# Patient Record
Sex: MALE | Race: WHITE | Employment: FULL TIME | URBAN - METROPOLITAN AREA
[De-identification: names, ages, dates, MRNs, and addresses within clinical notes are randomized per-mention and may not be internally consistent; named-entity substitution may affect disease eponyms.]

---

## 2023-09-25 ENCOUNTER — OFFICE VISIT (OUTPATIENT)
Dept: ORTHOPEDIC SURGERY | Age: 33
End: 2023-09-25
Payer: COMMERCIAL

## 2023-09-25 ENCOUNTER — HOSPITAL ENCOUNTER (OUTPATIENT)
Dept: CT IMAGING | Facility: CLINIC | Age: 33
Discharge: HOME | End: 2023-09-27
Payer: COMMERCIAL

## 2023-09-25 VITALS — WEIGHT: 202 LBS | HEIGHT: 70 IN | BODY MASS INDEX: 28.92 KG/M2

## 2023-09-25 DIAGNOSIS — S82.391A CLOSED INTRA-ARTICULAR FRACTURE OF DISTAL END OF RIGHT TIBIA, INITIAL ENCOUNTER: Primary | ICD-10-CM

## 2023-09-25 DIAGNOSIS — S82.391A CLOSED INTRA-ARTICULAR FRACTURE OF DISTAL END OF RIGHT TIBIA, INITIAL ENCOUNTER: ICD-10-CM

## 2023-09-25 PROCEDURE — 73700 CT LOWER EXTREMITY W/O DYE: CPT

## 2023-09-25 PROCEDURE — 99203 OFFICE O/P NEW LOW 30 MIN: CPT | Performed by: ORTHOPAEDIC SURGERY

## 2023-09-25 NOTE — PROGRESS NOTES
This 34year-old patient is seen here for an injury he sustained to his right leg on 9/24/2023 when he was in [place] and was playing basketball. He was playing in the backyard and there was a little gravel path which was about 2 or 3 inches below the walkway. Because of the irregularity his toe got caught on the edge and he twisted his ankle and heard a pop. He had x-rays carried out locally and he brought the CD as well as should be the x-ray on his phone. Out of the splint examination shows significant swelling and marked tenderness over the lateral malleolus. He was able to show about 20 degrees of flexion extension of the ankle. X-rays: I reviewed the x-rays and on first look I thought it was a osteochondral fracture from the distal tibia but it appears it is from the talus lateral medial dome. Diagnosis: Osteochondral fracture talus. Treatment: Arranging for him to have a CT scan of this and we will see him again after that. He had an urgent CT scan carried out and it shows small osteochondral fracture from the talus which could be chronic in nature. I have asked the patient to return to the office tomorrow to discuss treatment option.

## 2023-09-26 ENCOUNTER — OFFICE VISIT (OUTPATIENT)
Dept: ORTHOPEDIC SURGERY | Age: 33
End: 2023-09-26
Payer: COMMERCIAL

## 2023-09-26 VITALS — BODY MASS INDEX: 28.92 KG/M2 | WEIGHT: 202 LBS | HEIGHT: 70 IN

## 2023-09-26 DIAGNOSIS — M72.2 PLANTAR FASCIITIS OF LEFT FOOT: ICD-10-CM

## 2023-09-26 DIAGNOSIS — S82.391A CLOSED INTRA-ARTICULAR FRACTURE OF DISTAL END OF RIGHT TIBIA, INITIAL ENCOUNTER: Primary | ICD-10-CM

## 2023-09-26 PROCEDURE — 99213 OFFICE O/P EST LOW 20 MIN: CPT | Performed by: ORTHOPAEDIC SURGERY

## 2023-09-26 RX ORDER — HYDROCODONE BITARTRATE AND ACETAMINOPHEN 5; 325 MG/1; MG/1
1 TABLET ORAL EVERY 6 HOURS PRN
Qty: 28 TABLET | Refills: 0 | Status: SHIPPED | OUTPATIENT
Start: 2023-09-26 | End: 2023-10-03

## 2023-09-26 NOTE — PROGRESS NOTES
This patient who had an injury to his right ankle and had CT scan carried out yesterday he is seen here to discuss options. The other complaint the patient had is of pain in both the hips from Planter fasciitis for which he has been treated with orthotics. These orthotics caused him about $400. He has had them since April and he does not feel that they have given him any benefit. He still continues to have significant plantar fascia pain particularly worse on the left side even after playing short game of basketball. As for the right ankle reviewed the x-ray and CT scan findings with him. The small osteochondral fracture from the talus is on the lateral side and will be difficult to approach this and therefore excision of this lesion advised. He is agreeable to this process and to unload the lateral side of the joint we will provide him with this very small medial heel wedge to be put on his orthotics. Patient is also willing to have corticosteroid injection in the left heel for Planter fasciitis. He had one before which she said lasted about 2 to 3 days only. The plan is that the he will have arthroscopy of the right ankle with removal of the osteochondral fragment and injection for the plantar fascia on the left side but I will check out the tender point on the left heel immediately preoperatively.

## 2023-10-04 ENCOUNTER — ANESTHESIA EVENT (OUTPATIENT)
Dept: OPERATING ROOM | Age: 33
End: 2023-10-04
Payer: COMMERCIAL

## 2023-10-04 ENCOUNTER — HOSPITAL ENCOUNTER (OUTPATIENT)
Age: 33
Setting detail: OUTPATIENT SURGERY
Discharge: HOME | End: 2023-10-04
Attending: ORTHOPAEDIC SURGERY | Admitting: ORTHOPAEDIC SURGERY
Payer: COMMERCIAL

## 2023-10-04 ENCOUNTER — ANESTHESIA (OUTPATIENT)
Dept: OPERATING ROOM | Age: 33
End: 2023-10-04
Payer: COMMERCIAL

## 2023-10-04 VITALS
HEIGHT: 70 IN | BODY MASS INDEX: 28.92 KG/M2 | OXYGEN SATURATION: 100 % | SYSTOLIC BLOOD PRESSURE: 128 MMHG | RESPIRATION RATE: 13 BRPM | HEART RATE: 59 BPM | WEIGHT: 202 LBS | DIASTOLIC BLOOD PRESSURE: 80 MMHG | TEMPERATURE: 97 F

## 2023-10-04 DIAGNOSIS — M95.8 OSTEOCHONDRAL DEFECT OF TALUS: Primary | ICD-10-CM

## 2023-10-04 PROCEDURE — 7100000000 HC PACU RECOVERY - FIRST 15 MIN: Performed by: ORTHOPAEDIC SURGERY

## 2023-10-04 PROCEDURE — 2580000003 HC RX 258: Performed by: ANESTHESIOLOGY

## 2023-10-04 PROCEDURE — 3700000001 HC ADD 15 MINUTES (ANESTHESIA): Performed by: ORTHOPAEDIC SURGERY

## 2023-10-04 PROCEDURE — 7100000001 HC PACU RECOVERY - ADDTL 15 MIN: Performed by: ORTHOPAEDIC SURGERY

## 2023-10-04 PROCEDURE — 2709999900 HC NON-CHARGEABLE SUPPLY: Performed by: ORTHOPAEDIC SURGERY

## 2023-10-04 PROCEDURE — 6360000002 HC RX W HCPCS: Performed by: ORTHOPAEDIC SURGERY

## 2023-10-04 PROCEDURE — 64447 NJX AA&/STRD FEMORAL NRV IMG: CPT | Performed by: ANESTHESIOLOGY

## 2023-10-04 PROCEDURE — 2500000003 HC RX 250 WO HCPCS: Performed by: ORTHOPAEDIC SURGERY

## 2023-10-04 PROCEDURE — 7100000011 HC PHASE II RECOVERY - ADDTL 15 MIN: Performed by: ORTHOPAEDIC SURGERY

## 2023-10-04 PROCEDURE — 6360000002 HC RX W HCPCS: Performed by: ANESTHESIOLOGY

## 2023-10-04 PROCEDURE — 2500000003 HC RX 250 WO HCPCS: Performed by: NURSE ANESTHETIST, CERTIFIED REGISTERED

## 2023-10-04 PROCEDURE — 3700000000 HC ANESTHESIA ATTENDED CARE: Performed by: ORTHOPAEDIC SURGERY

## 2023-10-04 PROCEDURE — 3600000003 HC SURGERY LEVEL 3 BASE: Performed by: ORTHOPAEDIC SURGERY

## 2023-10-04 PROCEDURE — 6360000002 HC RX W HCPCS: Performed by: NURSE ANESTHETIST, CERTIFIED REGISTERED

## 2023-10-04 PROCEDURE — 3600000013 HC SURGERY LEVEL 3 ADDTL 15MIN: Performed by: ORTHOPAEDIC SURGERY

## 2023-10-04 PROCEDURE — 7100000010 HC PHASE II RECOVERY - FIRST 15 MIN: Performed by: ORTHOPAEDIC SURGERY

## 2023-10-04 RX ORDER — ONDANSETRON 2 MG/ML
4 INJECTION INTRAMUSCULAR; INTRAVENOUS
Status: DISCONTINUED | OUTPATIENT
Start: 2023-10-04 | End: 2023-10-04 | Stop reason: HOSPADM

## 2023-10-04 RX ORDER — DEXAMETHASONE SODIUM PHOSPHATE 10 MG/ML
INJECTION, SOLUTION INTRAMUSCULAR; INTRAVENOUS PRN
Status: DISCONTINUED | OUTPATIENT
Start: 2023-10-04 | End: 2023-10-04 | Stop reason: SDUPTHER

## 2023-10-04 RX ORDER — PROPOFOL 10 MG/ML
INJECTION, EMULSION INTRAVENOUS PRN
Status: DISCONTINUED | OUTPATIENT
Start: 2023-10-04 | End: 2023-10-04 | Stop reason: SDUPTHER

## 2023-10-04 RX ORDER — FENTANYL CITRATE 50 UG/ML
25 INJECTION, SOLUTION INTRAMUSCULAR; INTRAVENOUS EVERY 5 MIN PRN
Status: DISCONTINUED | OUTPATIENT
Start: 2023-10-04 | End: 2023-10-04 | Stop reason: HOSPADM

## 2023-10-04 RX ORDER — SODIUM CHLORIDE 0.9 % (FLUSH) 0.9 %
5-40 SYRINGE (ML) INJECTION PRN
Status: DISCONTINUED | OUTPATIENT
Start: 2023-10-04 | End: 2023-10-04 | Stop reason: HOSPADM

## 2023-10-04 RX ORDER — SODIUM CHLORIDE 0.9 % (FLUSH) 0.9 %
5-40 SYRINGE (ML) INJECTION EVERY 12 HOURS SCHEDULED
Status: DISCONTINUED | OUTPATIENT
Start: 2023-10-04 | End: 2023-10-04 | Stop reason: HOSPADM

## 2023-10-04 RX ORDER — ROPIVACAINE HYDROCHLORIDE 5 MG/ML
INJECTION, SOLUTION EPIDURAL; INFILTRATION; PERINEURAL
Status: DISCONTINUED | OUTPATIENT
Start: 2023-10-04 | End: 2023-10-04 | Stop reason: SDUPTHER

## 2023-10-04 RX ORDER — MIDAZOLAM HYDROCHLORIDE 1 MG/ML
2 INJECTION INTRAMUSCULAR; INTRAVENOUS ONCE
Status: COMPLETED | OUTPATIENT
Start: 2023-10-04 | End: 2023-10-04

## 2023-10-04 RX ORDER — SODIUM CHLORIDE 9 MG/ML
INJECTION, SOLUTION INTRAVENOUS PRN
Status: DISCONTINUED | OUTPATIENT
Start: 2023-10-04 | End: 2023-10-04 | Stop reason: HOSPADM

## 2023-10-04 RX ORDER — METHYLPREDNISOLONE ACETATE 40 MG/ML
INJECTION, SUSPENSION INTRA-ARTICULAR; INTRALESIONAL; INTRAMUSCULAR; SOFT TISSUE PRN
Status: DISCONTINUED | OUTPATIENT
Start: 2023-10-04 | End: 2023-10-04 | Stop reason: ALTCHOICE

## 2023-10-04 RX ORDER — MIDAZOLAM HYDROCHLORIDE 1 MG/ML
INJECTION INTRAMUSCULAR; INTRAVENOUS PRN
Status: DISCONTINUED | OUTPATIENT
Start: 2023-10-04 | End: 2023-10-04 | Stop reason: SDUPTHER

## 2023-10-04 RX ORDER — HYDROCODONE BITARTRATE AND ACETAMINOPHEN 5; 325 MG/1; MG/1
1 TABLET ORAL EVERY 6 HOURS PRN
Qty: 32 TABLET | Refills: 0 | Status: SHIPPED | OUTPATIENT
Start: 2023-10-04 | End: 2023-10-11

## 2023-10-04 RX ORDER — FENTANYL CITRATE 50 UG/ML
INJECTION, SOLUTION INTRAMUSCULAR; INTRAVENOUS PRN
Status: DISCONTINUED | OUTPATIENT
Start: 2023-10-04 | End: 2023-10-04 | Stop reason: SDUPTHER

## 2023-10-04 RX ORDER — LIDOCAINE HYDROCHLORIDE 10 MG/ML
1 INJECTION, SOLUTION EPIDURAL; INFILTRATION; INTRACAUDAL; PERINEURAL
Status: DISCONTINUED | OUTPATIENT
Start: 2023-10-05 | End: 2023-10-04 | Stop reason: HOSPADM

## 2023-10-04 RX ORDER — SODIUM CHLORIDE 9 MG/ML
INJECTION, SOLUTION INTRAVENOUS CONTINUOUS
Status: DISCONTINUED | OUTPATIENT
Start: 2023-10-04 | End: 2023-10-04

## 2023-10-04 RX ORDER — BUPIVACAINE HYDROCHLORIDE AND EPINEPHRINE 5; 5 MG/ML; UG/ML
INJECTION, SOLUTION EPIDURAL; INTRACAUDAL; PERINEURAL PRN
Status: DISCONTINUED | OUTPATIENT
Start: 2023-10-04 | End: 2023-10-04 | Stop reason: ALTCHOICE

## 2023-10-04 RX ORDER — HYDROMORPHONE HYDROCHLORIDE 1 MG/ML
0.5 INJECTION, SOLUTION INTRAMUSCULAR; INTRAVENOUS; SUBCUTANEOUS EVERY 5 MIN PRN
Status: DISCONTINUED | OUTPATIENT
Start: 2023-10-04 | End: 2023-10-04 | Stop reason: HOSPADM

## 2023-10-04 RX ORDER — ZOLPIDEM TARTRATE 10 MG/1
TABLET ORAL NIGHTLY PRN
COMMUNITY

## 2023-10-04 RX ORDER — ONDANSETRON 2 MG/ML
INJECTION INTRAMUSCULAR; INTRAVENOUS PRN
Status: DISCONTINUED | OUTPATIENT
Start: 2023-10-04 | End: 2023-10-04 | Stop reason: SDUPTHER

## 2023-10-04 RX ORDER — LIDOCAINE HYDROCHLORIDE 20 MG/ML
INJECTION, SOLUTION EPIDURAL; INFILTRATION; INTRACAUDAL; PERINEURAL PRN
Status: DISCONTINUED | OUTPATIENT
Start: 2023-10-04 | End: 2023-10-04 | Stop reason: SDUPTHER

## 2023-10-04 RX ORDER — SODIUM CHLORIDE, SODIUM LACTATE, POTASSIUM CHLORIDE, CALCIUM CHLORIDE 600; 310; 30; 20 MG/100ML; MG/100ML; MG/100ML; MG/100ML
INJECTION, SOLUTION INTRAVENOUS CONTINUOUS
Status: DISCONTINUED | OUTPATIENT
Start: 2023-10-04 | End: 2023-10-04 | Stop reason: HOSPADM

## 2023-10-04 RX ORDER — FENTANYL CITRATE 50 UG/ML
100 INJECTION, SOLUTION INTRAMUSCULAR; INTRAVENOUS ONCE
Status: COMPLETED | OUTPATIENT
Start: 2023-10-04 | End: 2023-10-04

## 2023-10-04 RX ADMIN — FENTANYL CITRATE 100 MCG: 50 INJECTION INTRAMUSCULAR; INTRAVENOUS at 12:30

## 2023-10-04 RX ADMIN — DEXAMETHASONE SODIUM PHOSPHATE 10 MG: 10 INJECTION, SOLUTION INTRAMUSCULAR; INTRAVENOUS at 12:38

## 2023-10-04 RX ADMIN — FENTANYL CITRATE 100 MCG: 50 INJECTION INTRAMUSCULAR; INTRAVENOUS at 12:15

## 2023-10-04 RX ADMIN — ROPIVACAINE HYDROCHLORIDE 35 ML: 5 INJECTION EPIDURAL; INFILTRATION; PERINEURAL at 12:00

## 2023-10-04 RX ADMIN — PROPOFOL 200 MG: 10 INJECTION, EMULSION INTRAVENOUS at 12:34

## 2023-10-04 RX ADMIN — SODIUM CHLORIDE, POTASSIUM CHLORIDE, SODIUM LACTATE AND CALCIUM CHLORIDE: 600; 310; 30; 20 INJECTION, SOLUTION INTRAVENOUS at 11:02

## 2023-10-04 RX ADMIN — Medication 2000 MG: at 12:31

## 2023-10-04 RX ADMIN — LIDOCAINE HYDROCHLORIDE 6 MG: 20 INJECTION, SOLUTION EPIDURAL; INFILTRATION; INTRACAUDAL; PERINEURAL at 12:34

## 2023-10-04 RX ADMIN — MIDAZOLAM 2 MG: 1 INJECTION INTRAMUSCULAR; INTRAVENOUS at 12:17

## 2023-10-04 RX ADMIN — ONDANSETRON 4 MG: 2 INJECTION INTRAMUSCULAR; INTRAVENOUS at 13:34

## 2023-10-04 RX ADMIN — MIDAZOLAM 2 MG: 1 INJECTION INTRAMUSCULAR; INTRAVENOUS at 12:30

## 2023-10-04 ASSESSMENT — PAIN SCALES - GENERAL: PAINLEVEL_OUTOF10: 0

## 2023-10-04 NOTE — DISCHARGE INSTRUCTIONS
Orthopaedic Instructions:  -Weight bearing status: Non weight bearing with the right leg  -Do not remove dressings or splint until your post-operative follow up date. It is important that you do not get your splint wet. To avoid this and still maintain proper hygiene, you can wrap a garbage/plastic bag (or similar waterproof material) about the splinted arm/leg and secure it with tape while showering. One should still attempt to keep splint out of water with this method. If your splint were to fall off, it is important that you do not attempt to put it back on. Instead, return to the orthopaedic clinic for reapplication (see number below to call). -Always look for signs of compartment syndrome: pain out of proportion to the injury, pain not controlled with pain medication, numbness in digits, changing of color of digits (paleness). If these signs occur return to ED immediately for reassessment.  -Ice (20 minutes on and off 1 hour) and elevate above the level of the heart to reduce swelling and throbbing pain.  -Call the office or come to Emergency Room if signs of infection appear (hot, swollen, red, draining pus, fever)  -Take medications as prescribed.  -Wean off narcotics (percocet/norco) as soon as possible. Do not take tylenol if still taking narcotics.  -Follow up with Dr. Retia Nyhan in his office 14 days after surgery. Call [phone] to schedule/confirm.

## 2023-10-04 NOTE — ANESTHESIA POSTPROCEDURE EVALUATION
Department of Anesthesiology  Postprocedure Note    Patient: Tereso Mixon  MRN: [id number]  YOB: 1990  Date of evaluation: 10/4/2023      Procedure Summary     Date: 10/04/23 Room / Location: [facility] 04 / [address] [place] [address]     Anesthesia Start: 7709 Anesthesia Stop: 1400    Procedure: ARTHROSCOPIC RIGHT ANKLE REMOVAL OF LOOSE BODY, INJECTION LEFT PLANTAR FASCIITIS (DEPO MEDROL) (Right: Ankle) Diagnosis:       Closed fracture of posterior malleolus, right, initial encounter      Plantar fasciitis      (Closed fracture of posterior malleolus, right, initial encounter [S82.391A])      (Plantar fasciitis [M72.2])    Surgeons: Alcira Rivas MD Responsible Provider: Danny Herring MD    Anesthesia Type: general, MAC, regional ASA Status: 1          Anesthesia Type: No value filed.     Victor Hugo Phase I: Victor Hugo Score: 10    Victor Hugo Phase II: Victor Hugo Score: 10      Anesthesia Post Evaluation    Patient location during evaluation: PACU  Patient participation: complete - patient participated  Level of consciousness: awake and alert  Airway patency: patent  Nausea & Vomiting: no nausea and no vomiting  Complications: no  Cardiovascular status: hemodynamically stable  Respiratory status: acceptable  Hydration status: euvolemic

## 2023-10-04 NOTE — ANESTHESIA PROCEDURE NOTES
Peripheral Block    Patient location during procedure: pre-op  Reason for block: post-op pain management and at surgeon's request  Start time: 10/4/2023 12:00 PM  End time: 10/4/2023 12:12 PM  Staffing  Performed: anesthesiologist   Anesthesiologist: Louise Canales MD  Performed by: Louise Canales MD  Authorized by:  Louise Canales MD    Preanesthetic Checklist  Completed: patient identified, IV checked, site marked, risks and benefits discussed, surgical/procedural consents, equipment checked, pre-op evaluation, timeout performed, anesthesia consent given, oxygen available, monitors applied/VS acknowledged, fire risk safety assessment completed and verbalized and blood product R/B/A discussed and consented  Peripheral Block   Patient position: supine  Prep: ChloraPrep  Provider prep: mask and sterile gloves  Patient monitoring: cardiac monitor, continuous pulse ox and IV access  Block type: Saphenous and Sciatic  Laterality: right  Injection technique: single-shot  Guidance: ultrasound guided    Needle   Needle type: insulated echogenic nerve stimulator needle   Needle localization: ultrasound guidance  Assessment   Injection assessment: negative aspiration for heme, no paresthesia on injection, local visualized surrounding nerve on ultrasound and no intravascular symptoms  Paresthesia pain: none  Slow fractionated injection: yes  Hemodynamics: stable  Real-time US image taken/store: yes  Outcomes: uncomplicated and patient tolerated procedure well    Medications Administered  ropivacaine (NAROPIN) injection 0.5% - Perineural   35 mL - 10/4/2023 12:00:00 PM

## 2023-10-04 NOTE — BRIEF OP NOTE
Brief Postoperative Note      Patient: Jennifer Purcell  YOB: 1990  MRN: [id number]    Date of Procedure: 10/4/2023    Pre-Op Diagnosis Codes:  1) Right lateral talar dome osteochondral defect  2) Left plantar fasciitis     Post-Op Diagnosis:   1) Right lateral talar dome osteochondral defect  2) Left plantar fasciitis        Procedure(s):  -Arthroscopic Loose body removal of right ankle  -Right talar dome antegrade microfracture   -Left plantar fascia corticosteroid injection    Surgeon(s):  Saad Barillas MD    Assistant:  Resident: Caitlyn Foote DO    Anesthesia: General    Tourniquet Time: 34 minutes     Estimated Blood Loss (mL): 5mL    Complications: None    Specimens:   * No specimens in log *    Implants:  * No implants in log *      Drains: * No LDAs found *    Findings: Right lateral talar dome OCD lesion measuring 2x1cm       Electronically signed by Jabari Harvey DO on 10/4/2023 at 1:42 PM

## 2023-10-05 PROBLEM — M72.2 PLANTAR FASCIITIS OF LEFT FOOT: Status: ACTIVE | Noted: 2023-10-05

## 2023-10-05 PROBLEM — M95.8 OSTEOCHONDRAL DEFECT OF TALUS: Status: ACTIVE | Noted: 2023-10-05

## 2023-10-05 NOTE — OP NOTE
Operative Note      Patient: Renata Thomas  YOB: 1990  MRN: [id number]     Date of Procedure: 10/4/2023     Pre-Op Diagnosis Codes:  1) Right lateral talar dome osteochondral defect  2) Left plantar fasciitis      Post-Op Diagnosis:   1) Right lateral talar dome osteochondral defect  2) Left plantar fasciitis        Procedure(s):  -Arthroscopic Loose body removal of right ankle  -Right talar dome antegrade microfracture   -Left plantar fascia corticosteroid injection     Surgeon(s):  Mahad Feldamn MD     Assistant:  Resident: Francisco Frye DO     Anesthesia: General     Tourniquet Time: 34 minutes      Estimated Blood Loss (mL): 5mL     Complications: None     Specimens:   * No specimens in log *     Implants:  * No implants in log *      Drains: * No LDAs found *     Findings: Right lateral talar dome OCD lesion measuring 2x1cm      Indications:  Patient is a 34year old male with a history of left plantar fasciitis who presented to the orthopedic clinic with pain at the ankle after playing basketball and his toe got caught on the edge of a gravel pathway and the patient twisted his right ankle and heard an audible pop. Immediately he was unable to ambulate after the incident. CT scan of the right ankle revealed a loose body of the lateral talar dome. Treatment options were explained to the patient and the patient would like this loose body removed. Risks/benefits/and alternatives were explained to the patient and after long discussion patient would like to proceed by the way of right ankle arthroscopic loose body removal with marrow stimulation if sizeable as well as a left plantar fascia corticosteroid injection. Detailed Description of Procedure: On the day of surgery the patient was met in the pre-operative unit where written consent was obtained and the operative site was marked with permanent marker. Pre-op block was performed by anesthesia.  The patient was wheeled back to the operating

## 2023-10-17 ENCOUNTER — OFFICE VISIT (OUTPATIENT)
Dept: ORTHOPEDIC SURGERY | Age: 33
End: 2023-10-17

## 2023-10-17 VITALS — BODY MASS INDEX: 27.49 KG/M2 | HEIGHT: 70 IN | WEIGHT: 192 LBS

## 2023-10-17 DIAGNOSIS — M95.8 OSTEOCHONDRAL DEFECT OF TALUS: Primary | ICD-10-CM

## 2023-10-17 PROCEDURE — 99024 POSTOP FOLLOW-UP VISIT: CPT | Performed by: ORTHOPAEDIC SURGERY

## 2023-10-17 NOTE — PROGRESS NOTES
This patient who had undergone arthroscopic removal of the osteochondral fragment from the right talus is seen here in follow-up. The splint was removed today. The portals are healing satisfactorily. He is able to move the ankle little bit but it is swollen. No evidence of infection. The sutures are being removed today. He will remain nonweightbearing on that side. However when he is sitting around he will just gently tap the foot on the floor so that he can have a little motion at the ankle. This would also help to minimize DVT. He also had a plantar fascia injection but he says it has not helped him at all. Return in 2 weeks time at that time we will get him a little wedge to partially unload the lateral side. And he can start and then some weightbearing.

## 2023-10-30 ENCOUNTER — OFFICE VISIT (OUTPATIENT)
Dept: ORTHOPEDIC SURGERY | Age: 33
End: 2023-10-30

## 2023-10-30 VITALS — WEIGHT: 192 LBS | BODY MASS INDEX: 27.49 KG/M2 | HEIGHT: 70 IN

## 2023-10-30 DIAGNOSIS — M95.8 OSTEOCHONDRAL DEFECT OF TALUS: Primary | ICD-10-CM

## 2023-10-30 PROCEDURE — 1073RET COMPLETION OF WORKABILITY PRESCRIPTION: Performed by: ORTHOPAEDIC SURGERY

## 2023-10-30 PROCEDURE — 99024 POSTOP FOLLOW-UP VISIT: CPT | Performed by: ORTHOPAEDIC SURGERY

## 2023-10-30 NOTE — PROGRESS NOTES
Chief Complaint   Patient presents with    Follow-up     10/04/2023 : RT ANKLE SCOPE  with removal loose body and plantar fascitis injection   This patient who had an osteochondral fracture of the right talus which was removed arthroscopically is seen here in follow-up. The patient has been using crutches has remained nonweightbearing to the same time he has not been doing any ankle exercises and therefore the ankle and foot are stiff and swollen. Examination shows the incisions are all healed. He has very little motion of the ankle. There is no evidence of infection. Diagnosis: Osteochondral fracture right talus with the microfracturing of the fracture site. Treatment: Start him on physical therapy 3-4 times a week with range of motion weightbearing as tolerated and have also given him a felt wedge that he will contribute to the size of his heel and insert to unload the lateral side of the ankle. See him again in 2 weeks time.

## 2023-11-07 ENCOUNTER — TELEPHONE (OUTPATIENT)
Dept: ORTHOPEDIC SURGERY | Age: 33
End: 2023-11-07

## 2023-11-07 DIAGNOSIS — S82.391A FRACTURE, POSTERIOR MALLEOLUS, RIGHT, CLOSED, INITIAL ENCOUNTER: Primary | ICD-10-CM

## 2023-11-07 NOTE — TELEPHONE ENCOUNTER
[facility] ([facility]) Physical therapy called to report that diagnosis code on order for Physical therapy is not covered M95.8. However diagnosis code that is in records of S82.391A is covered. Can the code be corrected prior to patients appointment tomorrow 11/8?

## 2023-11-08 ENCOUNTER — HOSPITAL ENCOUNTER (OUTPATIENT)
Dept: PHYSICAL THERAPY | Age: 33
Setting detail: THERAPIES SERIES
Discharge: HOME | End: 2023-11-08
Payer: COMMERCIAL

## 2023-11-08 PROCEDURE — 97110 THERAPEUTIC EXERCISES: CPT

## 2023-11-08 PROCEDURE — 97162 PT EVAL MOD COMPLEX 30 MIN: CPT

## 2023-11-08 PROCEDURE — 97016 VASOPNEUMATIC DEVICE THERAPY: CPT

## 2023-11-08 NOTE — THERAPY EVALUATION
[facility]  [address]  [place]: [phone]   F: [phone]     Physical Therapy Evaluation    Date:  2023  Patient: Valorie Holcomb  : 1990  MRN: [id number]  Physician: Aleks Birmingham MD     Insurance: [id number] [facility] [employer] (60/60 combined w/ OT/Speech, NPRE)  Medical Diagnosis: X21.108K (ICD-10-CM) - Fracture, posterior malleolus, right, closed, initial encounter  Rehab Codes: M25.571 right ankle pain, M25.671 Right ankle stiffness  Onset Date: 10/4/23                                 Next 's appt: 23    Subjective:   CC:R ankle pain after arthroscopic removal of OCD on talar dome  HPI: He was playing basketball in which injury happened. Patient reports that he has pain with walking. He is in a wedge but he still in pain with weight bearing and is unable to DF foot. He reports being able to put 60-80% WB on right side. He has not been icing at all and not been performing ROM or activity with ankle. 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 Aggravating Factors: WB, walking  Alleviating Factors:Elevating, rest    Per referral:  Please Eval/Treat for 2-3 visits per week for 6-8 weeks and develop home exercise program focusing on right ankle. Please include active assist ROM, unrestricted active ROM, passive ROM, strengthening, and HEP as well as any additional modalities you feel would be appropriate not specifically addressed above. Weight bearing status: Weight bearing as tolerated. Thank you for your assistance in caring for this patient and feel free to call my office with questions/concerns at [phone].     PMHx: [] Unremarkable [] Diabetes [] HTN  [] Pacemaker   [] MI/Heart Problems [] Cancer [] Arthritis [] Asthma                         [x] refer to full medical chart  In [facility]  [] Other:        Comorbidities:   [] Obesity

## 2023-11-10 ENCOUNTER — HOSPITAL ENCOUNTER (OUTPATIENT)
Dept: PHYSICAL THERAPY | Age: 33
Setting detail: THERAPIES SERIES
Discharge: HOME | End: 2023-11-10
Payer: COMMERCIAL

## 2023-11-10 PROCEDURE — 97110 THERAPEUTIC EXERCISES: CPT

## 2023-11-10 PROCEDURE — 97140 MANUAL THERAPY 1/> REGIONS: CPT

## 2023-11-10 NOTE — FLOWSHEET NOTE
[facility]   [address]   Phone: [phone]   Fax: [phone]    Physical Therapy Daily Treatment Note      Date:  11/10/2023  Patient Name:  Valorie Holcomb    :  1990  MRN: [id number]  Physician: Aleks Birmingham MD                             Insurance: [employer] (60/60 combined w/ OT/Speech, NPRE)  Medical Diagnosis: U29.490Q (ICD-10-CM) - Fracture, posterior malleolus, right, closed, initial encounter  Rehab Codes: M25.571 right ankle pain, M25.671 Right ankle stiffness  Onset Date: 10/4/23                                 Next 's appt: 23  Visit# / total visits:   Cancels/No Shows: 0/0    Precautions: WBAT     Subjective:  Patient states he has minimal pain currently but took Tylenol prior to PT. States he feels his calf is very tight and has a knot in the middle. Pain:  [x] Yes  [] No Location: R ankle Pain Rating: (0-10 scale) /10  Pain altered Tx:  [x] No  [] Yes  Action:  Comments:    Objective:  INTERVENTIONS  INTERVENTIONS  Reps/ Time Weight/ Level Completed  Today Comments          MODALITIES        Vaso to R ankle 10'  Low            MANUAL        MFR to gastroc/soleus complex 5'  x    PROM with gentle distraction to increase DF 5'  x           EXERCISES        Long seated: Ankle pumps 10x  x    Calf stretch w/ towel 3x30''  x    Ankle circles  10x ea  x Cw/ccw          Standing:       Weight shifts 10x ea  x AP/ML   Walking fwd w/ 1 UE in // bars 5 laps  x           Gait training:       Ambulation w/ 1 crutch 75ft  x CGA, cues to improve heel strike/TKE and toe off w/ knee flexion. Pt tends to keep knee locked in extension          Other:    Patient Education/Home Program :   Access Code: [email or web address]  URL: [email or web address]  Date: 2023  Prepared by: Ravi Moctezuma     Exercises  - Supine Active Ankle Pumps  - 2-3 x daily - 7 x weekly - 3 sets - 10 reps  - Supine Ankle Inversion Eversion AROM  - 2-3 x

## 2023-11-13 ENCOUNTER — OFFICE VISIT (OUTPATIENT)
Dept: ORTHOPEDIC SURGERY | Age: 33
End: 2023-11-13

## 2023-11-13 ENCOUNTER — APPOINTMENT (OUTPATIENT)
Dept: PHYSICAL THERAPY | Age: 33
End: 2023-11-13
Payer: COMMERCIAL

## 2023-11-13 VITALS — BODY MASS INDEX: 27.49 KG/M2 | HEIGHT: 70 IN | WEIGHT: 192 LBS

## 2023-11-13 DIAGNOSIS — M95.8 OSTEOCHONDRAL DEFECT OF TALUS: Primary | ICD-10-CM

## 2023-11-13 PROCEDURE — 99024 POSTOP FOLLOW-UP VISIT: CPT | Performed by: ORTHOPAEDIC SURGERY

## 2023-11-13 NOTE — PROGRESS NOTES
Chief Complaint   Patient presents with    Pain     S/P : 10/04/2023 RT ANKLE : POST PT   This patient had undergone removal of loose body and the treating of the base of the osteochondral fracture of the right talus from the lateral side is seen here in follow-up. The patient still has not started physical therapy and he states that they would not able to accommodate him earlier than now. Patient has been trying to weight-bear but has found some painful. Examination: The patient's ankle is in significant equinus. He is not able to dorsiflex to clinical deformity. Diagnosis: Status post removal of osteochondral fragment of the talus and microfracture of the talus on the 10/5/2023. Treatment: I discussed with him the severity of this equinus deformity. I have given him a nonstarchy stockinette and to start dorsiflexing the ankle. I have shown him the proper position to put it on and do it in sitting position so that he is resting on the ground. He will do this several times a day. He will continue his gait with crutches full weightbearing. I will see him again in 1 week.

## 2023-11-14 ENCOUNTER — HOSPITAL ENCOUNTER (OUTPATIENT)
Dept: PHYSICAL THERAPY | Age: 33
Setting detail: THERAPIES SERIES
Discharge: HOME | End: 2023-11-14
Payer: COMMERCIAL

## 2023-11-14 PROCEDURE — 97016 VASOPNEUMATIC DEVICE THERAPY: CPT

## 2023-11-14 PROCEDURE — 97140 MANUAL THERAPY 1/> REGIONS: CPT

## 2023-11-14 PROCEDURE — 97110 THERAPEUTIC EXERCISES: CPT

## 2023-11-16 ENCOUNTER — HOSPITAL ENCOUNTER (OUTPATIENT)
Dept: PHYSICAL THERAPY | Age: 33
Setting detail: THERAPIES SERIES
Discharge: HOME | End: 2023-11-16
Payer: COMMERCIAL

## 2023-11-16 PROCEDURE — 97140 MANUAL THERAPY 1/> REGIONS: CPT

## 2023-11-16 PROCEDURE — 97016 VASOPNEUMATIC DEVICE THERAPY: CPT

## 2023-11-16 PROCEDURE — 97110 THERAPEUTIC EXERCISES: CPT

## 2023-11-16 NOTE — FLOWSHEET NOTE
Stretch into Heel Raise 10x2 6\" x 3-4\" hold into DF   SLS single UE support 3x20\"  x    Fitter Taps 15x  x A/P, focusing on DF mobilization with posterior part    Other:    Patient Education/Home Program :   Access Code: [email or web address]  URL: [email or web address]  Date: 11/08/2023  Prepared by: Angel Spencer     Exercises  - Supine Active Ankle Pumps  - 2-3 x daily - 7 x weekly - 3 sets - 10 reps  - Supine Ankle Inversion Eversion AROM  - 2-3 x daily - 7 x weekly - 3 sets - 10 reps  - Seated Calf Towel Stretch  - 2 x daily - 7 x weekly - 3 sets - 30-60 seconds hold  - Side to Side Weight Shift with Head Rotations and Unilateral Counter Support  - 1 x daily - 7 x weekly - 2-3 sets - 10 reps  - Standing Anterior Posterior Weight Shift with Chair  - 1 x daily - 7 x weekly - 2-3 sets - 10 reps     Pt. Education:  [x] Yes  [] No  [x] Reviewed Prior HEP/Ed  Method of Education: [x] Verbal  [x] Demo  [] Written  Comprehension of Education:  [x] Verbalizes understanding. [x] Demonstrates understanding. [] Needs review. [x] Demonstrates/verbalizes HEP/Ed previously given. Specific Instructions for next treatment : Increase WB tolerance, ROM       Assessment: [x] Progressing toward goals. Continued heavy focus on improving ankle DF ROM this date. Less time spent with MFR this date with significant improvement in soft tissue pliability today compared to visit on 11/14 with provider. Added in fitter taps to further improve ankle mobility and challenge balance. Vaso for pain and edema management following exercises today. [] No change. [] Other:     [x] Patient would continue to benefit from skilled physical therapy services in order to: decrease pain, improve ROM, Strength, and function so that he can return to PLOF, normalize gait, and return to exercising    GOALS:   STG: (to be met in 6 treatments)  ? Pain:3/10 or less right ankle pain to improve ability to WB for normalizing gait  ?  ROM: R ankle DF to

## 2023-11-20 ENCOUNTER — OFFICE VISIT (OUTPATIENT)
Dept: ORTHOPEDIC SURGERY | Age: 33
End: 2023-11-20

## 2023-11-20 VITALS — BODY MASS INDEX: 27.49 KG/M2 | HEIGHT: 70 IN | WEIGHT: 192 LBS

## 2023-11-20 DIAGNOSIS — M95.8 OSTEOCHONDRAL DEFECT OF TALUS: Primary | ICD-10-CM

## 2023-11-20 PROCEDURE — 99024 POSTOP FOLLOW-UP VISIT: CPT | Performed by: ORTHOPAEDIC SURGERY

## 2023-11-20 NOTE — PROGRESS NOTES
This patient who had undergone arthroscopic removal of an osteochondral fragment from the right ankle is seen here in follow-up. Patient has been attending physical therapy and also doing exercises at home. Examination: There is definite improvement in his dorsiflexion. However when he is walking he still tends to walk with an equinus. If he tries to put his foot down he obviously flexes his knee. Diagnosis: Status post arthroscopic removal of osteochondral fragment right talus. Treatment: Advised to continue physical therapy and now have advised him to start stretching dorsiflexion with the knee fully extended. Also given him a prescription for medial heel wedge to unload the lateral side. The ranges half inch thick. I will see him again in a week's time.

## 2023-11-21 ENCOUNTER — HOSPITAL ENCOUNTER (OUTPATIENT)
Dept: PHYSICAL THERAPY | Age: 33
Setting detail: THERAPIES SERIES
Discharge: HOME | End: 2023-11-21
Payer: COMMERCIAL

## 2023-11-21 PROCEDURE — 97110 THERAPEUTIC EXERCISES: CPT

## 2023-11-21 PROCEDURE — 97140 MANUAL THERAPY 1/> REGIONS: CPT

## 2023-11-21 PROCEDURE — 97016 VASOPNEUMATIC DEVICE THERAPY: CPT

## 2023-11-21 NOTE — FLOWSHEET NOTE
[address] Outpatient Physical Therapy   [address]   Phone: [phone]   Fax: [phone]    Physical Therapy Daily Treatment Note      Date:  2023  Patient Name:  Varghese Levy    :  1990  MRN: [id number]  Physician: Devora Phelan MD                             Insurance: [employer] (60/60 combined w/ OT/Speech, NPRE)  Medical Diagnosis: K14.175N (ICD-10-CM) - Fracture, posterior malleolus, right, closed, initial encounter  Rehab Codes: M25.571 right ankle pain, M25.671 Right ankle stiffness  Onset Date: 10/4/23                                 Next 's appt: 23  Visit# / total visits:    Cancels/No Shows: 0/0    Precautions: WBAT     Subjective:    Patient reports today that he had appt with Dr. Mark Caceres yesterday. Reported that the he more satisfied with ROM at this time. Reported that he continues to get pain in ankle with WB. Pain:  [x] Yes  [] No Location: R ankle Pain Rating: (0-10 scale) 5-6/10  Pain altered Tx:  [x] No  [] Yes  Action:  Comments:    Objective:  INTERVENTIONS  INTERVENTIONS  Reps/ Time Weight/ Level Completed  Today Comments          MODALITIES        Vaso to R ankle 15'  Low compression, 34 degrees x           MANUAL        MFR to gastroc/soleus complex 6'  x IASTM today   PROM with gentle distraction to increase DF 5'      Grade IV A/P talocrural mobs 6'  x    DF MWM on Mat table 10x2  x           EXERCISES        Long seated: Ankle pumps 10x      Calf stretch w/ towel 3x30''      Ankle circles  10x ea   Cw/ccw   Supine Gastroc/hamstring Stretch 3x30\"  x                  Standing:       Weight shifts 10x ea   AP/ML   Walking fwd w/ 1 UE in // bars 5 laps             Gait training:       Ambulation w/ 1 crutch 75ft   CGA, cues to improve heel strike/TKE and toe off w/ knee flexion.  Pt tends to keep knee locked in extension   Step Gastroc Stretch into Heel Raise 10x2 6\" x 3-4\" hold into DF   SLS single UE support 3x20\"  x    Fitter

## 2023-11-27 ENCOUNTER — HOSPITAL ENCOUNTER (OUTPATIENT)
Dept: PHYSICAL THERAPY | Age: 33
Setting detail: THERAPIES SERIES
Discharge: HOME | End: 2023-11-27
Payer: COMMERCIAL

## 2023-11-27 ENCOUNTER — OFFICE VISIT (OUTPATIENT)
Dept: ORTHOPEDIC SURGERY | Age: 33
End: 2023-11-27

## 2023-11-27 VITALS — WEIGHT: 192 LBS | BODY MASS INDEX: 27.49 KG/M2 | HEIGHT: 70 IN

## 2023-11-27 DIAGNOSIS — M95.8 OSTEOCHONDRAL DEFECT OF TALUS: Primary | ICD-10-CM

## 2023-11-27 PROCEDURE — 97110 THERAPEUTIC EXERCISES: CPT

## 2023-11-27 PROCEDURE — 99024 POSTOP FOLLOW-UP VISIT: CPT | Performed by: ORTHOPAEDIC SURGERY

## 2023-11-27 PROCEDURE — 97016 VASOPNEUMATIC DEVICE THERAPY: CPT

## 2023-11-27 PROCEDURE — 97140 MANUAL THERAPY 1/> REGIONS: CPT

## 2023-11-27 NOTE — PROGRESS NOTES
This patient who had an osteochondral fracture of the talus excised on 10/4/2023 is seen here in follow-up. Examination: The patient's gait as well as range of motion has improved. In supine position with the leg completely extended he was coming to within about neutral.    He is attending physical therapy. I again showed and demonstrated to him stretching of the tendo Achillis using the staircase at home holding onto the banister. This was shown to him last visit but he was not able to carry that out adequately. Continue with the physical therapy return in 2 weeks. His  return to work is expected to be in January.

## 2023-11-27 NOTE — FLOWSHEET NOTE
posterior part    Mini Squats 10x2  x    Slant Board Stretch 30\" x 4  x 2 sets knee straight, 2 sets knee slightly bent   Retro TM Walking 3 min  . 07 mph x    Other:    Patient Education/Home Program :   Access Code: [email or web address]  URL: FindMySong/  Date: 11/08/2023  Prepared by: Queen Samen     Exercises  - Supine Active Ankle Pumps  - 2-3 x daily - 7 x weekly - 3 sets - 10 reps  - Supine Ankle Inversion Eversion AROM  - 2-3 x daily - 7 x weekly - 3 sets - 10 reps  - Seated Calf Towel Stretch  - 2 x daily - 7 x weekly - 3 sets - 30-60 seconds hold  - Side to Side Weight Shift with Head Rotations and Unilateral Counter Support  - 1 x daily - 7 x weekly - 2-3 sets - 10 reps  - Standing Anterior Posterior Weight Shift with Chair  - 1 x daily - 7 x weekly - 2-3 sets - 10 reps     Pt. Education:  [x] Yes  [] No  [x] Reviewed Prior HEP/Ed  Method of Education: [x] Verbal  [x] Demo  [x] Written  Comprehension of Education:  [x] Verbalizes understanding. [x] Demonstrates understanding. [] Needs review. [x] Demonstrates/verbalizes HEP/Ed previously given. Access Code: [email or web address]  URL: FindMySong/  Date: 11/08/2023  Prepared by: Queen Anna     Exercises  - Supine Active Ankle Pumps  - 2-3 x daily - 7 x weekly - 3 sets - 10 reps  - Supine Ankle Inversion Eversion AROM  - 2-3 x daily - 7 x weekly - 3 sets - 10 reps  - Seated Calf Towel Stretch  - 2 x daily - 7 x weekly - 3 sets - 30-60 seconds hold  - Side to Side Weight Shift with Head Rotations and Unilateral Counter Support  - 1 x daily - 7 x weekly - 2-3 sets - 10 reps  - Standing Anterior Posterior Weight Shift with Chair  - 1 x daily - 7 x weekly - 2-3 sets - 10 reps    Added 11/21:  - Single Leg Stance  - 1 x daily - 7 x weekly - 1 sets - 3 reps - 20-30\" hold  - Mini Squat  - 1 x daily - 7 x weekly - 2 sets - 15 reps  - Standing Ankle Dorsiflexion Stretch on Chair  - 1 x daily - 7 x weekly - 2 sets - 10 reps - 2-5\"

## 2023-11-29 ENCOUNTER — HOSPITAL ENCOUNTER (OUTPATIENT)
Dept: PHYSICAL THERAPY | Age: 33
Setting detail: THERAPIES SERIES
Discharge: HOME | End: 2023-11-29
Payer: COMMERCIAL

## 2023-11-29 PROCEDURE — 97110 THERAPEUTIC EXERCISES: CPT

## 2023-11-29 PROCEDURE — 97140 MANUAL THERAPY 1/> REGIONS: CPT

## 2023-12-05 ENCOUNTER — HOSPITAL ENCOUNTER (OUTPATIENT)
Dept: PHYSICAL THERAPY | Age: 33
Setting detail: THERAPIES SERIES
Discharge: HOME | End: 2023-12-05
Payer: COMMERCIAL

## 2023-12-05 PROCEDURE — 97110 THERAPEUTIC EXERCISES: CPT

## 2023-12-05 PROCEDURE — 97140 MANUAL THERAPY 1/> REGIONS: CPT

## 2023-12-05 NOTE — FLOWSHEET NOTE
gait      Assessment: [x] Progressing toward goals. Continued focus on improving R ankle ROM, particularly DF. Additional repos of DF stretch on wall performed today to further promote ankle DF. Also increased time with SLS to work on [employer] acceptance of RLE. Patient reported mild increase in pain with longer duration of SLS, but otherwise no replication of pain with treatment today. [] No change. [] Other:     [x] Patient would continue to benefit from skilled physical therapy services in order to: decrease pain, improve ROM, Strength, and function so that he can return to PLOF, normalize gait, and return to exercising    GOALS:   STG: (to be met in 6 treatments)  ? Pain:3/10 or less right ankle pain to improve ability to WB for normalizing gait  ? ROM: R ankle DF to neutral or better to improve gait mechanics. ? Strength: R ankle plantarflexion to improve toe off with walking and running for eventual return to recreational activity  ? 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    [] Other:      Treatment Charges: Mins Units   []  Modalities     [x]  Ther Exercise 26 2   [x]  Manual Therapy 15 1   []  Ther Activities     []  Aquatics     []  Neuromuscular     [] Vasocompression     [] Gait Training     [] Dry needling        [] 1 or 2 muscles        [] 3 or more muscles     []  Other     Total Billable time 41 3     Time In: 8:48 am      Time Out: 9:29 am    Electronically signed by:  Myles Read PTA

## 2023-12-07 ENCOUNTER — HOSPITAL ENCOUNTER (OUTPATIENT)
Dept: PHYSICAL THERAPY | Age: 33
Setting detail: THERAPIES SERIES
Discharge: HOME | End: 2023-12-07
Payer: COMMERCIAL

## 2023-12-07 PROCEDURE — 97140 MANUAL THERAPY 1/> REGIONS: CPT

## 2023-12-07 PROCEDURE — 97110 THERAPEUTIC EXERCISES: CPT

## 2023-12-07 NOTE — FLOWSHEET NOTE
1 sets - 3 reps - 20-30\" hold  - Mini Squat  - 1 x daily - 7 x weekly - 2 sets - 15 reps  - Standing Ankle Dorsiflexion Stretch on Chair  - 1 x daily - 7 x weekly - 2 sets - 10 reps - 2-5\" hold       Specific Instructions for next treatment : DF ROM, gait      Assessment: [x] Progressing toward goals. Continued focus on improving R ankle ROM, particularly DF. Added in step through exercise to work on keeping neutral foot position with gait. Also increased resistance with seated calf raises to increase strengthening potential of exercise. Notable improvement in quality of gait leaving clinic today. Patient declined vaso today noting minimal pain at end of session. [] No change. [] Other:     [x] Patient would continue to benefit from skilled physical therapy services in order to: decrease pain, improve ROM, Strength, and function so that he can return to PLOF, normalize gait, and return to exercising    GOALS:   STG: (to be met in 6 treatments)  ? Pain:3/10 or less right ankle pain to improve ability to WB for normalizing gait  ? ROM: R ankle DF to neutral or better to improve gait mechanics. ? Strength: R ankle plantarflexion to improve toe off with walking and running for eventual return to recreational activity  ? 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    [] Other:      Treatment Charges: Mins Units   []  Modalities     [x]  Ther Exercise 30 2   [x]  Manual Therapy 15 1   []  Ther Activities     []  Aquatics     []  Neuromuscular     []

## 2023-12-11 ENCOUNTER — OFFICE VISIT (OUTPATIENT)
Dept: ORTHOPEDIC SURGERY | Age: 33
End: 2023-12-11

## 2023-12-11 VITALS — HEIGHT: 70 IN | BODY MASS INDEX: 27.49 KG/M2 | WEIGHT: 192 LBS

## 2023-12-11 DIAGNOSIS — S82.391A CLOSED INTRA-ARTICULAR FRACTURE OF DISTAL END OF RIGHT TIBIA, INITIAL ENCOUNTER: Primary | ICD-10-CM

## 2023-12-11 PROCEDURE — 1073RET COMPLETION OF WORKABILITY PRESCRIPTION: Performed by: ORTHOPAEDIC SURGERY

## 2023-12-11 PROCEDURE — 99024 POSTOP FOLLOW-UP VISIT: CPT | Performed by: ORTHOPAEDIC SURGERY

## 2023-12-11 NOTE — PROGRESS NOTES
This patient who had an osteochondral fracture of the talus which was treated by removal of the loose body and drilling of the base is seen here in follow-up. Patient has no pain now and is able to ambulate full weightbearing without any difficulty. Examination: He has equal range of motion compared with the other side. There is no swelling. There is no tenderness. Diagnosis: Status post drilling of osteochondral fracture right talus. Treatment: The patient would like to return to work on December 22. However he may continue physical therapy which has helped him significantly and we will see him in 4 weeks. Next visit 3 views of the right ankle. This should also include standing AP of both the ankles.

## 2023-12-12 ENCOUNTER — HOSPITAL ENCOUNTER (OUTPATIENT)
Dept: PHYSICAL THERAPY | Age: 33
Setting detail: THERAPIES SERIES
Discharge: HOME | End: 2023-12-12
Payer: COMMERCIAL

## 2023-12-12 PROCEDURE — 97140 MANUAL THERAPY 1/> REGIONS: CPT

## 2023-12-12 PROCEDURE — 97110 THERAPEUTIC EXERCISES: CPT

## 2023-12-12 NOTE — PROGRESS NOTES
[facility] Outpatient Physical Therapy   [address]   Phone: [phone]   Fax: [phone]    Physical Therapy Daily Treatment and Progress Note      Date:  2023  Patient Name:  Melyssa Villa    :  1990  MRN: [id number]  Physician: Riri Dc MD                             Insurance: [employer] (60/60 combined w/ OT/Speech, NPRE)  Medical Diagnosis: D97.482Z (ICD-10-CM) - Fracture, posterior malleolus, right, closed, initial encounter  Rehab Codes: M25.571 right ankle pain, M25.671 Right ankle stiffness  Onset Date: 10/4/23                                 Next 's appt: 2024  Visit# / total visits: 10/20   Cancels/No Shows: 0/0    Precautions: WBAT     Subjective:    Patient reports feeling pretty good today with walking and his mobility continues to improve. He goes back to work on the . He is eager to return to previous level of function include pylometrics. Pain:  [x] Yes  [] No Location: R ankle Pain Rating: (0-10 scale) 3/10 with walking,   Pain altered Tx:  [x] No  [] Yes  Action:  Comments:    Objective:  INTERVENTIONS  INTERVENTIONS  Reps/ Time Weight/ Level Completed  Today Comments          MODALITIES        Vaso to R ankle 15'  Low compression, 34 degrees            MANUAL        MFR to gastroc/soleus complex 6'   IASTM today   PROM with gentle distraction to increase DF 5'      Grade IV A/P talocrural mobs 6'  x    DF MWM on Mat table 10x3\"  x           EXERCISES        Long seated:        Ankle pumps 10x      Calf stretch w/ towel 3x30''      Ankle circles  10x ea   Cw/ccw   Supine Gastroc/hamstring Stretch 3x30\"                    Standing:       Gait: increasing stride length    Decrease external rotation of RLE, step through gait   Step Gastroc Stretch into Heel Raise 10x2 6\"  3-4\" hold into DF   SLS single UE support 3x30\"  x    Fitter Taps 15x   A/P, focusing on DF mobilization with posterior part    Slant Board Stretch 30\" x 4  x 2 sets

## 2023-12-14 ENCOUNTER — HOSPITAL ENCOUNTER (OUTPATIENT)
Dept: PHYSICAL THERAPY | Age: 33
Setting detail: THERAPIES SERIES
Discharge: HOME | End: 2023-12-14
Payer: COMMERCIAL

## 2023-12-14 PROCEDURE — 97110 THERAPEUTIC EXERCISES: CPT

## 2023-12-14 PROCEDURE — 97140 MANUAL THERAPY 1/> REGIONS: CPT

## 2023-12-14 NOTE — FLOWSHEET NOTE
[address] Outpatient Physical Therapy   [address]   Phone: [phone]   Fax: [phone]    Physical Therapy Daily Treatment and Progress Note      Date:  2023  Patient Name:  Lindsay Tristan    :  1990  MRN: [id number]  Physician: Ana Palma MD                             Insurance: [employer] (60/60 combined w/ OT/Speech, NPRE)  Medical Diagnosis: R80.974E (ICD-10-CM) - Fracture, posterior malleolus, right, closed, initial encounter  Rehab Codes: M25.571 right ankle pain, M25.671 Right ankle stiffness  Onset Date: 10/4/23                                 Next 's appt: 2024  Visit# / total visits:    Cancels/No Shows: 0/0    Precautions: WBAT     Subjective:    Patient reports today that mobility continues to improve. Reported minimal pain that is steadily improving. Also noted that shooting basketball and pushing off of wall swimming causes minimal pain, and tolerance is improving. Pain:  [x] Yes  [] No Location: R ankle Pain Rating: (0-10 scale) \"occasionally\" 2-3/10 with walking,   Pain altered Tx:  [x] No  [] Yes  Action:  Comments:    Objective:  INTERVENTIONS  INTERVENTIONS  Reps/ Time Weight/ Level Completed  Today Comments          MODALITIES        Vaso to R ankle 15'  Low compression, 34 degrees            MANUAL        MFR to gastroc/soleus complex 6'  x IASTM today   PROM with gentle distraction to increase DF 5'      Grade IV A/P talocrural mobs 6'  x    DF MWM on Mat table 10x2 3\"  x           EXERCISES        Long seated:        Ankle pumps 10x      Calf stretch w/ towel 3x30''      Ankle circles  10x ea   Cw/ccw   Supine Gastroc/hamstring Stretch 3x30\"                    Standing:       Gait: increasing stride length    Decrease external rotation of RLE, step through gait   Step Gastroc Stretch into Heel Raise 10x2 6\"  3-4\" hold into DF, 2 up 1 down sequence   SLS  3x30\"  x    Fitter Taps 15x   A/P, focusing on DF mobilization with

## 2024-01-03 ENCOUNTER — HOSPITAL ENCOUNTER (OUTPATIENT)
Dept: PHYSICAL THERAPY | Age: 34
Setting detail: THERAPIES SERIES
Discharge: HOME | End: 2024-01-03

## 2024-01-03 NOTE — FLOWSHEET NOTE
[x] [facility]   Outpatient Rehabilitation & Therapy  [address]  P: [phone]   F: [phone]     Physical Therapy Cancel/No Show note    Date: 1/3/2024  Patient: Jeramie Pearce  : 1990  MRN: [id number]    Visit Count:   Cancels/No Shows to date: 3/2    For today's appointment patient:    [x]  Cancelled    [] Rescheduled appointment    [] No-show     Reason given by patient:    []  Patient ill:     []  Conflicting appointment    [] No transportation      [] Conflict with work    [] No reason given    [] Weather related    [] COVID-19    [x] Other:      Comments:  Per  patient called and canceled and is seeing physician next week and would like to hold to see if physician recommends to continue with therapy.  Patient will be placed on hold for 1 week until follow up with physician.       [] Next appointment was confirmed    Electronically signed by: Alvaro Yu PT

## 2024-01-08 ENCOUNTER — OFFICE VISIT (OUTPATIENT)
Dept: ORTHOPEDIC SURGERY | Age: 34
End: 2024-01-08
Payer: COMMERCIAL

## 2024-01-08 VITALS — HEIGHT: 70 IN | BODY MASS INDEX: 27.2 KG/M2 | WEIGHT: 190 LBS

## 2024-01-08 DIAGNOSIS — S82.391A CLOSED INTRA-ARTICULAR FRACTURE OF DISTAL END OF RIGHT TIBIA, INITIAL ENCOUNTER: Primary | ICD-10-CM

## 2024-01-08 PROCEDURE — 99213 OFFICE O/P EST LOW 20 MIN: CPT | Performed by: ORTHOPAEDIC SURGERY

## 2024-01-08 NOTE — PROGRESS NOTES
This patient who had a traumatic osteochondral fracture of the lateral aspect of the right talus is seen here in follow-up.  Patient had removal of the loose body as well as there is microfracture of the talus.  Patient has returned to his job.  He says he has a slight occasional pain which is quite tolerable.    Examination: He does have slight swelling of the ankle.  There is slight limitation on eversion of the foot.  Flexion extension of the ankle are normal.  He came here issues which did not have the wedge that was given to him.  He says that he is in the other shoe.    X-rays: Further x-rays taken today show the joint spaces still well-maintained.  There is a slight irregularity seen at the lateral aspect of the dome of the talus.  Alignment appears satisfactory.    Diagnosis: Osteochondral fracture lateral aspect of the dome of the right talus.    Treatment: I did remind him again that it is important for him to use the wedge to slightly decrease the weight load on the lateral aspect.  He is still very young and we do not want him to get early degenerative changes in the ankle joint.  He was also given a prescription to get 1 made out of leather which she has not gotten done yet.  He promises that he will get it done and he will send us a picture of it when he is got it.  That way he does not have to make another visit to the office.

## 2024-05-01 ENCOUNTER — CLINICAL DOCUMENTATION (OUTPATIENT)
Dept: PHYSICAL THERAPY | Age: 34
End: 2024-05-01

## 2024-11-05 NOTE — THERAPY DISCHARGE
[x] [facility]  Rehabilitation Services  [address]  [place], [place] [address]  Phone [phone]  Fax [phone]    Physical Therapy Discharge Note    Date: 2024      Patient: Jeramie Pearce  : 1990  MRN: [id number] Physician: Christopher Sutton MD                             Medical Diagnosis: S82.391A (ICD-10-CM) - Fracture, posterior malleolus, right, closed, initial encounter  Rehab Codes: M25.571 right ankle pain, M25.671 Right ankle stiffness   Onset Date: 10/4/23    Total visits attended:11  Cancels/No shows:3/2  Date of initial visit: 23                   [] Patient recovered from conditions. Treatment goals were met.  [] Patient received maximum benefit. No further therapy indicated at this time.  [] Patient demonstrated improvement from condition with  ** Of  ** Short term goals met.  []Patient demonstrated improvement from condition with **   Of **  Long term goals met.  [] Patient to continue exercise/home instructions independently.  [] Therapy interrupted due to:    [x] Patient has 2 or more no shows/cancels, is discontinued per our policy.    [] Patient has completed prescribed number of treatment sessions.    [x] Other: Patient did not return after follow up with physician on 24. Patient is to be discharged at this time. If patient is to require more physical therapy services, they are to obtain new script from physician.     Treatment Included:     [x] Therapeutic Exercise   82829  [] Iontophoresis: 4 mg/mL Dexamethasone Sodium Phosphate  mAmin  64963   [x] Therapeutic Activity  51185 [x] Vasopneumatic cold with compression  38035    [x] Gait Training   21478 [] Ultrasound   58794   [x] Neuromuscular Re-education  56147 [x] Electrical Stimulation Unattended  11539   [x] Manual Therapy  96623 [] Electrical Stimulation Attended  59812   [x] Instruction in HEP  [] Lumbar/Cervical Traction  35334   [] Aquatic Therapy   81385 [x]

## (undated) DEVICE — PADDING,UNDERCAST,COTTON, 4"X4YD STERILE: Brand: MEDLINE

## (undated) DEVICE — HYPODERMIC SAFETY NEEDLE: Brand: MAGELLAN

## (undated) DEVICE — Device: Brand: MICROAIRE®

## (undated) DEVICE — SUTURE ETHLN SZ 4-0 L18IN NONABSORBABLE BLK L19MM PS-2 3/8 1667H

## (undated) DEVICE — SYRINGE 20ML LL S/C 50

## (undated) DEVICE — [AGGRESSIVE CUTTER, ARTHROSCOPIC SHAVER BLADE,  DO NOT RESTERILIZE,  DO NOT USE IF PACKAGE IS DAMAGED,  KEEP DRY,  KEEP AWAY FROM SUNLIGHT]: Brand: TPS SMALL-JOINT

## (undated) DEVICE — [AGGRESSIVE PLUS CUTTER, ARTHROSCOPIC SHAVER BLADE,  DO NOT RESTERILIZE,  DO NOT USE IF PACKAGE IS DAMAGED,  KEEP DRY,  KEEP AWAY FROM SUNLIGHT]: Brand: FORMULA

## (undated) DEVICE — SOLUTION IV IRRIG 500ML 0.9% SODIUM CHL 2F7123

## (undated) DEVICE — SHOULDER SUSPENSION KIT 6 PER BOX

## (undated) DEVICE — YANKAUER,FLEXIBLE HANDLE,REGLR CAPACITY: Brand: MEDLINE INDUSTRIES, INC.

## (undated) DEVICE — Device

## (undated) DEVICE — DRESSING PETRO W2XL3IN SIL NONADHERING ADPTC TCH

## (undated) DEVICE — SPLINT QUICK STEP SCOTCHCAST 5 X 30

## (undated) DEVICE — SOLUTION IRRIG 3000ML 0.9% SOD CHL USP UROMATIC PLAS CONT

## (undated) DEVICE — GLOVE SURG SZ 7 CRM LTX FREE POLYISOPRENE POLYMER BEAD ANTI

## (undated) DEVICE — BLANKET WRM W29.9XL79.1IN UP BODY FORC AIR MISTRAL-AIR

## (undated) DEVICE — TUBING, SUCTION, 1/4" X 12', STRAIGHT: Brand: MEDLINE

## (undated) DEVICE — BLADE,CARBON-STEEL,15,STRL,DISPOSABLE,TB: Brand: MEDLINE

## (undated) DEVICE — TUBING PMP L16FT MAIN DISP FOR AR-6400 AR-6475

## (undated) DEVICE — GLOVE SURG SZ 75 CRM LTX FREE POLYISOPRENE POLYMER BEAD ANTI

## (undated) DEVICE — BANDAGE COMPR W6INXL5YD WHT BGE POLY COT M E WRP WV HK AND